# Patient Record
Sex: FEMALE | Race: WHITE | NOT HISPANIC OR LATINO | Employment: FULL TIME | URBAN - METROPOLITAN AREA
[De-identification: names, ages, dates, MRNs, and addresses within clinical notes are randomized per-mention and may not be internally consistent; named-entity substitution may affect disease eponyms.]

---

## 2019-07-31 ENCOUNTER — HOSPITAL ENCOUNTER (EMERGENCY)
Facility: MEDICAL CENTER | Age: 34
End: 2019-07-31
Attending: EMERGENCY MEDICINE
Payer: COMMERCIAL

## 2019-07-31 VITALS
BODY MASS INDEX: 34.1 KG/M2 | RESPIRATION RATE: 18 BRPM | HEART RATE: 96 BPM | SYSTOLIC BLOOD PRESSURE: 126 MMHG | OXYGEN SATURATION: 96 % | HEIGHT: 68 IN | DIASTOLIC BLOOD PRESSURE: 87 MMHG | WEIGHT: 225 LBS | TEMPERATURE: 98.6 F

## 2019-07-31 DIAGNOSIS — M54.32 SCIATICA OF LEFT SIDE: ICD-10-CM

## 2019-07-31 PROCEDURE — A9270 NON-COVERED ITEM OR SERVICE: HCPCS | Performed by: EMERGENCY MEDICINE

## 2019-07-31 PROCEDURE — 700102 HCHG RX REV CODE 250 W/ 637 OVERRIDE(OP): Performed by: EMERGENCY MEDICINE

## 2019-07-31 PROCEDURE — 99284 EMERGENCY DEPT VISIT MOD MDM: CPT

## 2019-07-31 RX ORDER — IBUPROFEN 600 MG/1
600 TABLET ORAL ONCE
Status: COMPLETED | OUTPATIENT
Start: 2019-07-31 | End: 2019-07-31

## 2019-07-31 RX ORDER — OXYCODONE HYDROCHLORIDE AND ACETAMINOPHEN 5; 325 MG/1; MG/1
2 TABLET ORAL ONCE
Status: COMPLETED | OUTPATIENT
Start: 2019-07-31 | End: 2019-07-31

## 2019-07-31 RX ORDER — OXYCODONE HYDROCHLORIDE AND ACETAMINOPHEN 5; 325 MG/1; MG/1
1-2 TABLET ORAL EVERY 6 HOURS PRN
Qty: 20 TAB | Refills: 0 | Status: SHIPPED | OUTPATIENT
Start: 2019-07-31 | End: 2019-08-03

## 2019-07-31 RX ADMIN — OXYCODONE HYDROCHLORIDE AND ACETAMINOPHEN 2 TABLET: 5; 325 TABLET ORAL at 12:30

## 2019-07-31 RX ADMIN — IBUPROFEN 600 MG: 600 TABLET ORAL at 12:30

## 2019-07-31 ASSESSMENT — ENCOUNTER SYMPTOMS
FEVER: 0
BACK PAIN: 1
TINGLING: 1
CHILLS: 0

## 2019-07-31 ASSESSMENT — PAIN SCALES - WONG BAKER: WONGBAKER_NUMERICALRESPONSE: HURTS A WHOLE LOT

## 2019-07-31 NOTE — ED TRIAGE NOTES
Chief Complaint   Patient presents with   • Low Back Pain     to triage by wheelchair c/o pain in left lower back radiating to left leg x 3 days. had difficulty ambulating this morning. has hx of sciatica. states now having severe spasm. denies fall/trauma.     Pt tearful in triage. Denies any urinary symptoms. Educated on triage process. Instructed to notify staff for any changes.

## 2019-07-31 NOTE — ED NOTES
Pt c/o pain to left lower back radiating into her spine x4 days. Pain increases with exertion and movement. Pt denies injury. Pt states hx of sciatica but this feels different. Pt describes pain as sharp pain with muscle spasms with intermittent muscle tingling to left side of body- stating fingers and toes are numb and tingling during muscle spasms. Pt states she took Excedrin and alternating ice/heat with no relief.     Pt is alert and oriented x3. Respirations even and unlabored. Patient tearful and in pain upon movement from wheelchair to gurney.

## 2019-07-31 NOTE — ED PROVIDER NOTES
ED Provider Note    Scribed for Finesse Vasquez M.D. by Aroldo Chapa. 7/31/2019, 11:43 AM.    Primary care provider: Pcp Pt States None  Means of arrival: walk in  History obtained from: Patient  History limited by: none    CHIEF COMPLAINT  Chief Complaint   Patient presents with   • Low Back Pain     to triage by wheelchair c/o pain in left lower back radiating to left leg x 3 days. had difficulty ambulating this morning. has hx of sciatica. states now having severe spasm. denies fall/trauma.       HPI  Serena Hummel is a 33 y.o. female who presents to the Emergency Department for lower left back pain onset 3 days ago. The patient states that the pain is sharp and radiates into the back of her left leg, calf, and shoulder. The pain is exacerbated by movement. She has a history of sciatic nerve pain, but states that she never got it checked out due to it only happening when she was pregnant. She denies any chance of being pregnant. She endorses left toe tingling, but denies any fever, chills, urinary incontinence. She is not allergic to any medications.      REVIEW OF SYSTEMS  Review of Systems   Constitutional: Negative for chills and fever.   Genitourinary:        No urinary incontinence.    Musculoskeletal: Positive for back pain.   Neurological: Positive for tingling.   All other systems reviewed and are negative.      PAST MEDICAL HISTORY   has a past medical history of Allergy, Hypertension (Dx 2009), LGSIL (low grade squamous intraepithelial dysplasia) (5/29/2012), Migraine (never Dx), and Pain.    SURGICAL HISTORY   has a past surgical history that includes other.    SOCIAL HISTORY  Social History     Tobacco Use   • Smoking status: Never Smoker   Substance Use Topics   • Alcohol use: Yes     Comment: rarely socially, never sice Pg   • Drug use: No      Social History     Substance and Sexual Activity   Drug Use No       FAMILY HISTORY  Family History   Problem Relation Age of Onset   • Heart  "Disease Mother         A-Fib, Mitral valve prolapse   • Cancer Father         [place]   • Diabetes Maternal Grandfather    • Alcohol/Drug Paternal Grandmother         Alcoholic   • Psychiatric Illness Paternal Grandmother         Depression   • Alcohol/Drug Paternal Grandfather         Alcoholic   • Heart Disease Paternal Grandfather         MI       CURRENT MEDICATIONS  Home Medications     Reviewed by Yas Patel R.N. (Registered Nurse) on 07/31/19 at 1021  Med List Status: Partial   Medication Last Dose Status   norethindrone-ethinyl estradiol-iron (MICROGESTIN FE1.5/30) 1.5-30 MG-MCG tablet not taking Active   ondansetron (ZOFRAN) 4 MG TABS tablet not taking Active   Oxycodone-Acetaminophen (PERCOCET-10)  MG TABS not taking Active                ALLERGIES  Allergies   Allergen Reactions   • Pineapple Shortness of Breath       PHYSICAL EXAM  VITAL SIGNS: BP (!) 163/95   Pulse (!) 117   Temp 37 °C (98.6 °F) (Temporal)   Resp 18   Ht 1.727 m (5' 8\")   Wt 102.1 kg (225 lb)   SpO2 93%   BMI 34.21 kg/m²     Constitutional: Well developed, Well nourished, No acute distress, Non-toxic appearance.   HENT: Normocephalic, Atraumatic, Bilateral external ears normal, oropharynx moist, No oral exudates, Nose normal.   Eyes:conjunctiva is normal, there are no signs of exudate.   Neck: Supple, no meningeal signs.  Lymphatic: No lymphadenopathy noted.   Cardiovascular: Regular rate and rhythm without murmurs gallops or rubs.   Thorax & Lungs: No respiratory distress. Breathing comfortably. Lungs are clear to auscultation bilaterally, there are no wheezes no rales. Chest wall is nontender.  Abdomen: Soft, nontender, nondistended. Bowel sounds are present.   Skin: Warm, Dry, No erythema,   Back: Patient has no midline tenderness.  Tenderness around left sacral region into gluteus medius. No CVA tenderness.  Musculoskeletal: Good range of motion in all major joints. No tenderness to palpation or major deformities " noted. Intact distal pulses, no clubbing, no cyanosis, no edema, patient is fire 5 strength in all distributions  Neurologic: DTR 2+ normal distal sensation. No signs of cauda equina syndrome. Alert & oriented x 3, Moving all extremities. No gross abnormalities.    Psychiatric: Affect normal, Judgment normal, Mood normal.     COURSE & MEDICAL DECISION MAKING  Pertinent Labs & Imaging studies reviewed. (See chart for details)    11:43 AM - Patient seen and examined at bedside. Patient will be treated with Motrin 600 mg and Percocet 325 mg per tablet 2 tabs. I informed the patient that this does appears to be an infringement of the sciatic nerve. The treatment for this is physical therapy and narcotics. Incase it does not get better she will also be receiving a referral to a Neurosurgeon. The differential diagnoses include but are not limited to: Sciatica    Decision Making:   I informed the patient that this does appears to be an impingement of the sciatic nerve.  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  Patient has no central findings.  At this point I will start the patient with pain medications recommend physical therapy the treatment for this is physical therapy and narcotics. Incase it does not get better she will also be receiving a referral to a Neurosurgeon.     I I reviewed prescription monitoring program for patient's narcotic use before prescribing a scheduled drug.The patient will not drink alcohol nor drive with prescribed medications      In prescribing controlled substances to this patient, I certify that I have obtained and reviewed the medical history this patient I have also made a good dhara effort to obtain applicable records from other providers who have treated the patient and records did not demonstrate any increased risk of substance abuse that would prevent me from prescribing controlled substances.     I have conducted a physical exam and documented  it. I have reviewed Ms. Hummel’s prescription history as maintained by the [place] Prescription Monitoring Program.     I have assessed the patient’s risk for abuse, dependency, and addiction using the validated Opioid Risk Tool available at https://www.mdcalc.com/lgurim-yawd-vnxo-ort-narcotic-abuse.     Given the above, I believe the benefits of controlled substance therapy outweigh the risks. The reasons for prescribing controlled substances include in my professional opinion, controlled substances are a reasonable choice for this patient. Accordingly, I have discussed the risk and benefits, treatment plan, and alternative therapies with the patient. The patient has been consented for the medication and understands the risks.         DISPOSITION:  Patient will be discharged home in stable condition.    FOLLOW UP:  [facility]  [address] [id number] [phone]  Schedule an appointment as soon as possible for a visit   for evaluation and treatment of your sciatic nerve impingement    Derick Liu III, M.D.  [address]  [place] [address]  [phone]    Schedule an appointment as soon as possible for a visit   For further evaluation      OUTPATIENT MEDICATIONS:  Discharge Medication List as of 7/31/2019  2:25 PM      START taking these medications    Details   oxyCODONE-acetaminophen (PERCOCET) 5-325 MG Tab Take 1-2 Tabs by mouth every 6 hours as needed for up to 3 days., Disp-20 Tab, R-0, Print Rx Paper, For 3 days               FINAL IMPRESSION  1. Sciatica of left side          IAroldo (Scribe), am scribing for, and in the presence of, Finesse Vasquez M.D..    Electronically signed by: Aroldo Chapa (Scribe), 7/31/2019    IFinesse M.D. personally performed the services described in this documentation, as scribed by Aroldo Chapa in my presence, and it is both accurate and complete.  E  The note accurately reflects work and decisions made by me.   Finesse Vasquez  7/31/2019  6:20 PM

## 2020-05-06 ENCOUNTER — APPOINTMENT (OUTPATIENT)
Dept: RADIOLOGY | Facility: MEDICAL CENTER | Age: 35
End: 2020-05-06
Attending: EMERGENCY MEDICINE
Payer: COMMERCIAL

## 2020-05-06 ENCOUNTER — HOSPITAL ENCOUNTER (EMERGENCY)
Facility: MEDICAL CENTER | Age: 35
End: 2020-05-06
Attending: EMERGENCY MEDICINE
Payer: COMMERCIAL

## 2020-05-06 VITALS
DIASTOLIC BLOOD PRESSURE: 83 MMHG | BODY MASS INDEX: 34.86 KG/M2 | WEIGHT: 230 LBS | SYSTOLIC BLOOD PRESSURE: 169 MMHG | HEIGHT: 68 IN | TEMPERATURE: 98.5 F | HEART RATE: 95 BPM | OXYGEN SATURATION: 97 % | RESPIRATION RATE: 13 BRPM

## 2020-05-06 DIAGNOSIS — S42.402A CLOSED FRACTURE OF LEFT ELBOW, INITIAL ENCOUNTER: ICD-10-CM

## 2020-05-06 DIAGNOSIS — T74.91XA DOMESTIC VIOLENCE OF ADULT, INITIAL ENCOUNTER: ICD-10-CM

## 2020-05-06 DIAGNOSIS — S52.045A NONDISPLACED FRACTURE OF CORONOID PROCESS OF LEFT ULNA, INITIAL ENCOUNTER FOR CLOSED FRACTURE: ICD-10-CM

## 2020-05-06 PROCEDURE — 73110 X-RAY EXAM OF WRIST: CPT | Mod: LT

## 2020-05-06 PROCEDURE — 29105 APPLICATION LONG ARM SPLINT: CPT

## 2020-05-06 PROCEDURE — 700111 HCHG RX REV CODE 636 W/ 250 OVERRIDE (IP)

## 2020-05-06 PROCEDURE — 96372 THER/PROPH/DIAG INJ SC/IM: CPT

## 2020-05-06 PROCEDURE — 73090 X-RAY EXAM OF FOREARM: CPT | Mod: LT

## 2020-05-06 PROCEDURE — 73080 X-RAY EXAM OF ELBOW: CPT | Mod: LT

## 2020-05-06 PROCEDURE — 302874 HCHG BANDAGE ACE 2 OR 3""

## 2020-05-06 PROCEDURE — 700111 HCHG RX REV CODE 636 W/ 250 OVERRIDE (IP): Performed by: EMERGENCY MEDICINE

## 2020-05-06 PROCEDURE — 99284 EMERGENCY DEPT VISIT MOD MDM: CPT

## 2020-05-06 RX ORDER — HYDROMORPHONE HYDROCHLORIDE 1 MG/ML
1 INJECTION, SOLUTION INTRAMUSCULAR; INTRAVENOUS; SUBCUTANEOUS ONCE
Status: COMPLETED | OUTPATIENT
Start: 2020-05-06 | End: 2020-05-06

## 2020-05-06 RX ORDER — OXYCODONE HYDROCHLORIDE AND ACETAMINOPHEN 5; 325 MG/1; MG/1
1-2 TABLET ORAL EVERY 4 HOURS PRN
Qty: 20 TAB | Refills: 0 | Status: SHIPPED | OUTPATIENT
Start: 2020-05-06 | End: 2020-05-10

## 2020-05-06 RX ORDER — ONDANSETRON 4 MG/1
4 TABLET, ORALLY DISINTEGRATING ORAL EVERY 6 HOURS PRN
Qty: 20 TAB | Refills: 0 | Status: SHIPPED | OUTPATIENT
Start: 2020-05-06 | End: 2020-05-06 | Stop reason: SDUPTHER

## 2020-05-06 RX ORDER — ONDANSETRON 4 MG/1
4 TABLET, ORALLY DISINTEGRATING ORAL EVERY 6 HOURS PRN
Qty: 20 TAB | Refills: 0 | Status: SHIPPED | OUTPATIENT
Start: 2020-05-06

## 2020-05-06 RX ORDER — ONDANSETRON 4 MG/1
4 TABLET, ORALLY DISINTEGRATING ORAL ONCE
Status: COMPLETED | OUTPATIENT
Start: 2020-05-06 | End: 2020-05-06

## 2020-05-06 RX ADMIN — ONDANSETRON 4 MG: 4 TABLET, ORALLY DISINTEGRATING ORAL at 20:10

## 2020-05-06 RX ADMIN — HYDROMORPHONE HYDROCHLORIDE 1 MG: 1 INJECTION, SOLUTION INTRAMUSCULAR; INTRAVENOUS; SUBCUTANEOUS at 18:39

## 2020-05-06 ASSESSMENT — LIFESTYLE VARIABLES: DO YOU DRINK ALCOHOL: NO

## 2020-05-07 NOTE — ED NOTES
"Assist RN: rounded on pt for pain re-rosa m, pt states pain is at adequate level, \"stll sore but ok\", ice pack provided, and additional pillow for support.  Pt placed for re-eval, awaiting ERP.    "

## 2020-05-07 NOTE — ED NOTES
LUE splint applied at bedside by ED Tech. Patient tolerated fairly well with minimal complaints of discomfort. L arm sling provided per orders - patient provided successful return demonstration of use of sling.
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
## 2020-05-07 NOTE — ED NOTES
Patient states that nausea has improved since receiving medication per orders. Provided with ice water per request. Denies further needs.

## 2020-05-07 NOTE — ED NOTES
Patient discharged in stable condition per orders. Wristband removed per protocol. Patient verbalized understanding of all discharge instructions. All belongings accounted for. Ambulatory to lobby with steady gait with L arm sling in place accompanied by ED RN.
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
